# Patient Record
Sex: FEMALE | Race: WHITE | Employment: FULL TIME | URBAN - METROPOLITAN AREA
[De-identification: names, ages, dates, MRNs, and addresses within clinical notes are randomized per-mention and may not be internally consistent; named-entity substitution may affect disease eponyms.]

---

## 2017-05-08 ENCOUNTER — OFFICE VISIT (OUTPATIENT)
Dept: INTERNAL MEDICINE CLINIC | Age: 37
End: 2017-05-08

## 2017-05-08 VITALS
HEART RATE: 76 BPM | DIASTOLIC BLOOD PRESSURE: 69 MMHG | WEIGHT: 184 LBS | HEIGHT: 70 IN | SYSTOLIC BLOOD PRESSURE: 106 MMHG | TEMPERATURE: 98.1 F | RESPIRATION RATE: 14 BRPM | BODY MASS INDEX: 26.34 KG/M2

## 2017-05-08 DIAGNOSIS — F32.A DEPRESSION WITH SUICIDAL IDEATION: Primary | ICD-10-CM

## 2017-05-08 DIAGNOSIS — R45.851 DEPRESSION WITH SUICIDAL IDEATION: Primary | ICD-10-CM

## 2017-05-08 NOTE — MR AVS SNAPSHOT
Visit Information Date & Time Provider Department Dept. Phone Encounter #  
 5/8/2017  9:00 AM Simran Purvis MD [facility] and [address] [place] [address] [phone] [id number] Follow-up Instructions Return if symptoms worsen or fail to improve. Upcoming Health Maintenance Date Due INFLUENZA AGE 9 TO ADULT 8/1/2017 PAP AKA CERVICAL CYTOLOGY 5/12/2019 DTaP/Tdap/Td series (2 - Td) 5/12/2026 Allergies as of 5/8/2017  Review Complete On: 5/8/2017 By: Conrad Calderón No Known Allergies Current Immunizations  Never Reviewed No immunizations on file. Not reviewed this visit Vitals BP Pulse Temp Resp Height(growth percentile) Weight(growth percentile) 106/69 76 98.1 °F (36.7 °C) 14 5' 10\" (1.778 m) 184 lb (83.5 kg) BMI OB Status Smoking Status 26.4 kg/m2 IUD Never Smoker Vitals History BMI and BSA Data Body Mass Index Body Surface Area  
 26.4 kg/m 2 2.03 m 2 Preferred Pharmacy Pharmacy Name Phone [id number] [address] [phone] Your Updated Medication List  
  
   
This list is accurate as of: 5/8/17  9:54 AM.  Always use your most recent med list.  
  
  
  
  
 buPROPion  mg XL tablet Commonly known as:  WELLBUTRIN XL  
300 mg. Cetirizine 10 mg Cap Take  by mouth.  
  
 citalopram 20 mg tablet Commonly known as:  Arellano Freeland 20 mg.  
  
 lamoTRIgine 100 mg tablet Commonly known as: LaMICtal  
100 mg.  
  
 levonorgestrel 20 mcg/24 hr (5 years) IUD Commonly known as:  MIRENA  
1 Each by IntraUTERine route once. multivitamin tablet Commonly known as:  ONE A DAY Take 1 Tab by mouth daily. Follow-up Instructions Return if symptoms worsen or fail to improve. [facility]! Dear Savita Priest: Thank you for requesting a Taazt account.   Our records indicate that you already have an active Squabbler account. You can access your account anytime at [email or web address] Did you know that you can access your hospital and ER discharge instructions at any time in Squabbler? You can also review all of your test results from your hospital stay or ER visit. Additional Information If you have questions, please visit the Frequently Asked Questions section of the Squabbler website at [email or web address] Remember, Squabbler is NOT to be used for urgent needs. For medical emergencies, dial 911. Now available from your iPhone and Android! Please provide this summary of care documentation to your next provider. Your primary care clinician is listed as Michael Hoffman. If you have any questions after today's visit, please call [phone].

## 2017-05-08 NOTE — LETTER
NOTIFICATION RETURN TO WORK / SCHOOL 
 
5/8/2017 10:07 AM 
 
Ms. Lynette Arango [facility] [id number] To Whom It May Concern: 
 
Lynette Arango is currently under the care of Mariella Nava. She will return to work/school on: May 8, 2017 without restrictions. If there are questions or concerns please have the patient contact our office to ensure appropriate release of medical information on file.   
 
 
 
Sincerely, 
 
 
Dimitry He MD

## 2017-05-08 NOTE — PROGRESS NOTES
Ankit Huang is a 39 y.o. female  Chief Complaint   Patient presents with    Other     need letter of clearance to back to work related to admission to [facility] on April 17, 2017     Patient discharged from [facility] on April 20th and then admitted to outpatient recovery center on April 20th until May 5, 2017

## 2017-05-08 NOTE — PROGRESS NOTES
Ms. Tressa Casas is a 39y.o. year old female who had concerns including Other. HPI:  Chief Complaint   Patient presents with    Other     need letter of clearance to back to work related to admission to [facility] on April 17, 2017     Suicidal ideation, x 2 weeks. She was in the [facility] for the last 2 weeks. Dr Lul Arellano is who will be following her. Past Medical History:   Diagnosis Date    Back pain     Depression      Current Outpatient Prescriptions   Medication Sig Dispense    buPROPion XL (WELLBUTRIN XL) 300 mg XL tablet 300 mg.  citalopram (CELEXA) 20 mg tablet 20 mg.     lamoTRIgine (LAMICTAL) 100 mg tablet 100 mg.  Cetirizine 10 mg cap Take  by mouth.  multivitamin (ONE A DAY) tablet Take 1 Tab by mouth daily.  levonorgestrel (MIRENA) 20 mcg/24 hr (5 years) IUD 1 Each by IntraUTERine route once. No current facility-administered medications for this visit. Reviewed PmHx, RxHx, FmHx, SocHx, AllgHx and updated and dated in the chart. ROS: Negative except for BOLD  General: fever, chills, fatigue  Respiratory: cough, SOB, wheezing  Cardiovascular:  CP, palpitation, PINA, edema   Gastrointestinal: N/V/D, bleeding  Genito-Urinary: dysuria, hematuria  Musculoskeletal: muscle weakness, pain, swelling    OBJECTIVE:   Visit Vitals    /69    Pulse 76    Temp 98.1 °F (36.7 °C)    Resp 14    Ht 5' 10\" (1.778 m)    Wt 184 lb (83.5 kg)    BMI 26.4 kg/m2     GEN: The patient appears well, alert, oriented x 3, in no distress. .   Lungs: clear bilaterally, good air entry, no wheezes, rhonchi or rales. Cardiovascular: regular rate and rhythm. S1 and S2 normal, no murmurs,      Psych: no SI, pleasant. Assessment/ Plan:       ICD-10-CM ICD-9-CM    1.  Depression with suicidal ideation F32.9 311 Resolved, pt seen at [facility] treatment program.     A97.060 V62.84        Pt called office to request letter to be sent here, then we can write the letter. No records of admission in our office as of yet. I have discussed the diagnosis with the patient and the intended plan as seen in the above orders. The patient has received an after-visit summary and questions were answered concerning future plans. Medication Side Effects and Warnings were discussed with patient. Follow-up Disposition:  Return if symptoms worsen or fail to improve.       Deloise Gowers, MD

## 2020-12-05 ENCOUNTER — OFFICE VISIT (OUTPATIENT)
Dept: URGENT CARE | Age: 40
End: 2020-12-05
Payer: COMMERCIAL

## 2020-12-05 VITALS — RESPIRATION RATE: 16 BRPM | HEART RATE: 84 BPM | TEMPERATURE: 99.2 F | OXYGEN SATURATION: 98 %

## 2020-12-05 DIAGNOSIS — B34.9 VIRAL ILLNESS: Primary | ICD-10-CM

## 2020-12-05 DIAGNOSIS — J02.9 SORE THROAT: ICD-10-CM

## 2020-12-05 LAB
S PYO AG THROAT QL: NEGATIVE
VALID INTERNAL CONTROL?: YES

## 2020-12-05 PROCEDURE — 87880 STREP A ASSAY W/OPTIC: CPT | Performed by: NURSE PRACTITIONER

## 2020-12-05 PROCEDURE — 99201 PR OFFICE OUTPATIENT NEW 10 MINUTES: CPT | Performed by: NURSE PRACTITIONER

## 2020-12-05 RX ORDER — DESVENLAFAXINE 100 MG/1
TABLET, EXTENDED RELEASE ORAL
COMMUNITY

## 2020-12-05 NOTE — PROGRESS NOTES
This patient was seen at [address] [place] [address] Urgent Care while in their vehicle due to COVID-19 pandemic with PPE and focused examination in order to decrease community viral transmission. The patient/guardian gave verbal consent to treat. Naty Phillips is a 36 y.o. female presenting to clinic today with throat pain, mild cough, congestion, and low grade fevers since yesterday. Denies any known exposure to COVID-19. States that she has only been to the dog park and the grocery store recently. Denies other complaint today. The history is provided by the patient. History limited by: nothing. Cold Symptoms   Associated symptoms include sore throat. Pertinent negatives include no chest pain, no chills, no rhinorrhea, no shortness of breath, no wheezing, no nausea and no vomiting. Past Medical History:   Diagnosis Date    Back pain     Depression         Past Surgical History:   Procedure Laterality Date    HX HEENT           Family History   Problem Relation Age of Onset    Cancer Mother     COPD Maternal Grandmother     Heart Disease Maternal Grandmother     Lung Disease Maternal Grandfather     Cancer Maternal Grandfather     Heart Disease Maternal Grandfather         Social History     Socioeconomic History    Marital status:      Spouse name: Not on file    Number of children: Not on file    Years of education: Not on file    Highest education level: Not on file   Occupational History    Not on file   Social Needs    Financial resource strain: Not on file    Food insecurity     Worry: Not on file     Inability: Not on file    Transportation needs     Medical: Not on file     Non-medical: Not on file   Tobacco Use    Smoking status: Never Smoker    Smokeless tobacco: Never Used   Substance and Sexual Activity    Alcohol use:  Yes     Alcohol/week: 2.0 standard drinks     Types: 2 Glasses of wine per week    Drug use: No    Sexual activity: Yes     Partners: Male Lifestyle    Physical activity     Days per week: Not on file     Minutes per session: Not on file    Stress: Not on file   Relationships    Social connections     Talks on phone: Not on file     Gets together: Not on file     Attends Anabaptist service: Not on file     Active member of club or organization: Not on file     Attends meetings of clubs or organizations: Not on file     Relationship status: Not on file    Intimate partner violence     Fear of current or ex partner: Not on file     Emotionally abused: Not on file     Physically abused: Not on file     Forced sexual activity: Not on file   Other Topics Concern    Not on file   Social History Narrative    Name preferred: Taran Trevizo     x 5 years, [employer]                ALLERGIES: Patient has no known allergies. Review of Systems   Constitutional: Positive for fever. Negative for chills. HENT: Positive for congestion and sore throat. Negative for rhinorrhea and sneezing. Respiratory: Positive for cough. Negative for chest tightness, shortness of breath and wheezing. Cardiovascular: Negative for chest pain. Gastrointestinal: Negative for abdominal pain, nausea and vomiting. Vitals:    12/05/20 1508   Pulse: 84   Resp: 16   Temp: 99.2 °F (37.3 °C)   SpO2: 98%       Physical Exam  Vitals signs and nursing note reviewed. Constitutional:       General: She is not in acute distress. Appearance: Normal appearance. She is not ill-appearing, toxic-appearing or diaphoretic. HENT:      Head: Normocephalic and atraumatic. Comments: BL TM pearly gray, no erythema, no effusion, no bulging  Tonsils 2+BL, no erythema, no exudate, uvula midline. Overall good dentition. No visible nasal congestion. No obvious palpable lymphadenopathy. Eyes:      Extraocular Movements: Extraocular movements intact. Conjunctiva/sclera: Conjunctivae normal.   Neck:      Musculoskeletal: Normal range of motion.    Cardiovascular:      Rate and Rhythm: Normal rate. Pulmonary:      Effort: Pulmonary effort is normal. No respiratory distress. Comments: Speaking in complete sentences without difficulty. Musculoskeletal: Normal range of motion. Neurological:      General: No focal deficit present. Mental Status: She is alert. Psychiatric:         Mood and Affect: Mood normal.         Behavior: Behavior normal.         Thought Content: Thought content normal.         Judgment: Judgment normal.         MDM   Results for orders placed or performed in visit on 12/05/20   AMB POC RAPID STREP A   Result Value Ref Range    VALID INTERNAL CONTROL POC Yes     Group A Strep Ag Negative Negative       PLAN:  Patient presents to clinic today with throat pain, congestion, and mildly elevated temperatures. No known COVID-19 exposure. Nontoxic appearing, no apparent distress. Throat is clear and non-erythematous. 1. COVID-19 test obtained, patient to self-quarantine until results return. 2. Rapid strep negative, no culture indicated because of benign physical exam.  3. Follow up with PCP as needed. 4. Go to ED with worsening symptoms, failure to improve, or any new symptoms. DIAGNOSIS:    ICD-10-CM ICD-9-CM    1. Viral illness  B34.9 079.99    2.  Sore throat  J02.9 462 AMB POC RAPID STREP A      NOVEL CORONAVIRUS (COVID-19)

## 2020-12-08 LAB — SARS-COV-2, NAA: NOT DETECTED

## 2020-12-28 ENCOUNTER — OFFICE VISIT (OUTPATIENT)
Dept: URGENT CARE | Age: 40
End: 2020-12-28
Payer: COMMERCIAL

## 2020-12-28 VITALS — HEART RATE: 82 BPM | TEMPERATURE: 99.5 F | RESPIRATION RATE: 16 BRPM | OXYGEN SATURATION: 99 %

## 2020-12-28 DIAGNOSIS — Z20.822 ENCOUNTER FOR LABORATORY TESTING FOR COVID-19 VIRUS: Primary | ICD-10-CM

## 2020-12-28 PROCEDURE — 99213 OFFICE O/P EST LOW 20 MIN: CPT | Performed by: EMERGENCY MEDICINE

## 2020-12-29 NOTE — PROGRESS NOTES
Pt here with known COVID exposure 12/24 but no Sx. They are here for screening test. This patient was seen in [facility] at [address] [place] [address] Urgent Care while outdoors at their vehicle due to COVID-19 pandemic with PPE and focused examination in order to decrease community viral transmission      The history is provided by the patient. Past Medical History:   Diagnosis Date    Back pain     Depression         Past Surgical History:   Procedure Laterality Date    HX HEENT           Family History   Problem Relation Age of Onset    Cancer Mother     COPD Maternal Grandmother     Heart Disease Maternal Grandmother     Lung Disease Maternal Grandfather     Cancer Maternal Grandfather     Heart Disease Maternal Grandfather         Social History     Socioeconomic History    Marital status:      Spouse name: Not on file    Number of children: Not on file    Years of education: Not on file    Highest education level: Not on file   Occupational History    Not on file   Social Needs    Financial resource strain: Not on file    Food insecurity     Worry: Not on file     Inability: Not on file    Transportation needs     Medical: Not on file     Non-medical: Not on file   Tobacco Use    Smoking status: Never Smoker    Smokeless tobacco: Never Used   Substance and Sexual Activity    Alcohol use:  Yes     Alcohol/week: 2.0 standard drinks     Types: 2 Glasses of wine per week    Drug use: No    Sexual activity: Yes     Partners: Male   Lifestyle    Physical activity     Days per week: Not on file     Minutes per session: Not on file    Stress: Not on file   Relationships    Social connections     Talks on phone: Not on file     Gets together: Not on file     Attends Anglican service: Not on file     Active member of club or organization: Not on file     Attends meetings of clubs or organizations: Not on file     Relationship status: Not on file    Intimate partner violence     Fear of current or ex partner: Not on file     Emotionally abused: Not on file     Physically abused: Not on file     Forced sexual activity: Not on file   Other Topics Concern    Not on file   Social History Narrative    Name preferred: Alpesh Perez     x 5 years, [employer]                ALLERGIES: Patient has no known allergies. Review of Systems   Constitutional: Negative for chills, fatigue and fever. HENT: Negative for congestion, rhinorrhea, sinus pressure, sneezing and sore throat. Respiratory: Negative for cough and shortness of breath. Cardiovascular: Negative for chest pain. Gastrointestinal: Negative for abdominal pain, diarrhea, nausea and vomiting. Genitourinary:        Denies possibility of pregnancy   Musculoskeletal: Negative for arthralgias and myalgias. Neurological: Negative for headaches. Vitals:    12/28/20 1936   Pulse: 82   Resp: 16   Temp: 99.5 °F (37.5 °C)   SpO2: 99%       Physical Exam  Vitals signs and nursing note reviewed. Constitutional:       General: She is not in acute distress. Appearance: Normal appearance. She is normal weight. She is not ill-appearing, toxic-appearing or diaphoretic. Comments: Pt examined in a vehicle  Well appearing   HENT:      Nose: No rhinorrhea. Mouth/Throat:      Mouth: Mucous membranes are moist.      Pharynx: Oropharynx is clear. No oropharyngeal exudate or posterior oropharyngeal erythema. Cardiovascular:      Rate and Rhythm: Normal rate and regular rhythm. Heart sounds: Normal heart sounds. Pulmonary:      Effort: Pulmonary effort is normal. No respiratory distress. Breath sounds: Normal breath sounds. No stridor. No wheezing, rhonchi or rales. Abdominal:      General: Bowel sounds are normal.      Palpations: Abdomen is soft. Skin:     Findings: No rash. Neurological:      Mental Status: She is alert and oriented to person, place, and time.    Psychiatric:         Mood and Affect: Mood normal.         Behavior: Behavior normal.         MDM. ICD-10-CM ICD-9-CM    1. Encounter for laboratory testing for COVID-19 virus  Z20.828 V01.79 NOVEL CORONAVIRUS (COVID-19)     No orders of the defined types were placed in this encounter. The patient's condition and possible alternative diagnoses were discussed with the patient and they verbalized understanding. The patient is to follow up with their primary care doctor for continued care. If signs and symptoms persist or become worse or new symptoms develop, the pt is to go immediately to the emergency department. Any new medications that may have been written for should be taken as directed but should always be discussed with the primary care physician and pharmacist. This was communicated to the patient. Pt instructed to quarantine until COVID testing results are back and then duration of quarantine will depend on result, current recommendations and symptoms. The patient is to get immediate re-evaluation for any new or worsening symptoms. They are to quarantine from other household members. It was recommended they stay hydrated and practice deep breathing exercises.            Procedures

## 2020-12-31 LAB — SARS-COV-2, NAA: NOT DETECTED

## 2024-08-22 ENCOUNTER — APPOINTMENT (OUTPATIENT)
Facility: HOSPITAL | Age: 44
End: 2024-08-22
Payer: COMMERCIAL

## 2024-08-22 ENCOUNTER — HOSPITAL ENCOUNTER (EMERGENCY)
Facility: HOSPITAL | Age: 44
Discharge: HOME | End: 2024-08-22
Attending: EMERGENCY MEDICINE
Payer: COMMERCIAL

## 2024-08-22 VITALS
TEMPERATURE: 98.2 F | RESPIRATION RATE: 18 BRPM | HEIGHT: 70 IN | OXYGEN SATURATION: 97 % | HEART RATE: 89 BPM | SYSTOLIC BLOOD PRESSURE: 109 MMHG | DIASTOLIC BLOOD PRESSURE: 82 MMHG | BODY MASS INDEX: 28.47 KG/M2 | WEIGHT: 198.85 LBS

## 2024-08-22 DIAGNOSIS — T14.8XXA SPLINTER: ICD-10-CM

## 2024-08-22 DIAGNOSIS — M79.671 RIGHT FOOT PAIN: Primary | ICD-10-CM

## 2024-08-22 DIAGNOSIS — L03.818 CELLULITIS OF OTHER SPECIFIED SITE: ICD-10-CM

## 2024-08-22 PROCEDURE — 73620 X-RAY EXAM OF FOOT: CPT

## 2024-08-22 PROCEDURE — 99283 EMERGENCY DEPT VISIT LOW MDM: CPT

## 2024-08-22 RX ORDER — CEPHALEXIN 500 MG/1
500 CAPSULE ORAL 4 TIMES DAILY
Qty: 28 CAPSULE | Refills: 0 | Status: SHIPPED | OUTPATIENT
Start: 2024-08-22 | End: 2024-08-29

## 2024-08-22 RX ORDER — CARIPRAZINE 1.5 MG/1
CAPSULE, GELATIN COATED ORAL
COMMUNITY
Start: 2024-08-20

## 2024-08-22 RX ORDER — LEVOTHYROXINE SODIUM 0.05 MG/1
TABLET ORAL
COMMUNITY

## 2024-08-22 ASSESSMENT — PAIN SCALES - GENERAL: PAINLEVEL_OUTOF10: 0

## 2024-08-22 NOTE — DISCHARGE INSTRUCTIONS
Today you are seen for right foot pain and redness of the foot following a splinter injury 5 days ago.  Based on the way the redness looks, I am sending you with a week long course of antibiotics to prevent this potential infection from getting worse.  Please take this as directed.  This antibiotic can be kind of a hassle, it needs to be taken 4 times a day.  Please take the full course.  If you notice that the redness is worsening or you do develop fever, chills, body aches, purulent/foul-smelling, discharge from the wound, difficulty walking, please return for reevaluation.  Please follow-up with your primary care provider, or if you do not have one and urgent care facility would also work, after you have finished the course of antibiotics seven days from now.

## 2024-08-22 NOTE — ED TRIAGE NOTES
Pt c/o large splinter from her deck going into the bottom of her right foot on Saturday. Pt states that she thought she had gotten it all out, but tenderness continues.

## 2024-08-22 NOTE — ED PROVIDER NOTES
[facility]  EMERGENCY DEPARTMENT ENCOUNTER      Pt Name: Jennifer Mosqueda  MRN: [id number]  Birthdate 1980  Date of evaluation: 8/22/2024  Provider: Johnson Griffin PA-C    CHIEF COMPLAINT       Chief Complaint   Patient presents with    Foot Injury         HISTORY OF PRESENT ILLNESS   (Location/Symptom, Timing/Onset, Context/Setting, Quality, Duration, Modifying Factors, Severity)  Note limiting factors.   HPI  43-year-old female coming in today with right foot pain for the last 5 days.  She says that 5 days ago she got a splinter on her foot when walking on her deck.  She says that the splinter was at least a few inches long, only part of the wood was stuck in her foot and she thinks she was able to get the whole piece of wood out.  Since then she has had some pain at the site and has had some trouble walking due to the pain.  As of this morning, she noticed that there was some redness at the foot.  She says it looks like there is a line of redness coming from the injury down the bottom of her foot.  She denies fever, chills, body aches.  Denies numbness, tingling, loss of function of the foot.  She says she believes she has had a tetanus in the last 5 years.    Review of External Medical Records:     Nursing Notes were reviewed.    REVIEW OF SYSTEMS    (2-9 systems for level 4, 10 or more for level 5)     Review of Systems   Musculoskeletal:         Right foot pain   Skin:         Redness of the bottom of the right foot       Except as noted above the remainder of the review of systems was reviewed and negative.       PAST MEDICAL HISTORY     Past Medical History:   Diagnosis Date    Back pain     Depression          SURGICAL HISTORY       Past Surgical History:   Procedure Laterality Date    HEENT           CURRENT MEDICATIONS       Discharge Medication List as of 8/22/2024  6:22 PM        CONTINUE these medications which have NOT CHANGED    Details   VRAYLAR 1.5 MG capsule [facility]WHistorical Med